# Patient Record
(demographics unavailable — no encounter records)

---

## 2024-10-24 NOTE — RESULTS/DATA
[FreeTextEntry1] : BREAST PATH/RAD REVIEW [facility] Radiology:  7/19/19-7/20/20 BL SC MG/US: BIRADs 2, Scattered Fibroglandular density, 7/28/21 BL SC US: BIRADS 2               BL SC MG: BIRADS 0, New L posterior medial 5 mm nodular asymmetry  8/02/21 L Dx MG/US: BIRADs 2, Stable L 8-9N1-2 4 mm oval circumscribed nodule, L 9:00- periareolar 4 mm cyst w/ debris corresp to stable MG finding 10/03/22 BL SC MG/US: BIRADS 2, Heterogeneously dense  3/14/23 L Dx MG/US: BIRADs 2, Scattered areas of fibroglandular density, L 12N3 no mammogram or sonographic findings at (Palp) area of concern, scattered benign calcs.  [facility]:  3/28/23 Breast MRI: BIRADs 4A - Few scattered sub-cm cysts Bilaterally, No suspicious enhancement in the Right breast  - L UIQ w/ no suspicious enhancement underlying marker denoting palp areas of concern - 7 mm linear NME in the slightly medial L RA location w/ associated prox mild duct dilatation (Rec L Dx US to eval for sonographic correlate then USGBx- if no correlate attempt MRGBx   4/24/23 L Dx US: BIRADs 1: No sonographic correlate to 7 mm linear NME in L RA breast - (Rec attempt MR GBx)   5/3/23 Left [RA] MR Guided core bx: Intraductal papilloma. Benign and Concordant. Stoplight Clip. (Rec Surgical c/s)   10/4/23 B/L SC MG: BIRADs 0. Scattered areas of fibroglandular density (SFGD). Indeterminate right posterior sub-cm nodular asymmetry (Rec R Dx MG/US)  10/12/23 R Dx MG/US: BIRADs 3. SFGD.  R upper posterior nodular asymmetry persists but not apparent on lateral or repeat MLO views and may represent overlapping glandular tissue (Rec 6-month f/u R Dx MG), R 11N5 0.3 cm cyst.    10/13/23 Breast MRI: BIRADs 3.  - L inner RA bx clip at bx-proven IDP with no significant associated enhancement - Stable R LIQ 7 mm linear NME favoring benign etiology, Stable anterior R LOQ 5 mm dominant foci of enhancement, Stable L dominant foci of enhancement in L LOQ [5 mm] and L UOQ [5 mm] since 3/28/23 favoring a benign etiology (Rec 6-month repeat MRI at time of R Dx MG)  - No new enhancement in bilateral breasts, No LAD   5/8/24 R Dx MG: BIRADs 2. Almost entirely fatty. Resolution of previously noted asymmetry.  5/10/24 Breast MRI: BIRADs 3. Stable R LIQ 7 mm linear NME since 3/28/23. Stable R anterior LOQ 5 mm dominant focus of enhancement. L inner RA susceptibility artifact from bx clip w/o significant residual enhancement.  (Rec 6-month f/u MRI to ascertain 2-yr stability)  10/7/24 BL SC MG (TC 5.5%): birads 2. SFGD. Bx marker in L anterior breast.

## 2024-10-24 NOTE — RESULTS/DATA
[FreeTextEntry1] : BREAST PATH/RAD REVIEW [facility] Radiology:  7/19/19-7/20/20 BL SC MG/US: BIRADs 2, Scattered Fibroglandular density, 7/28/21 BL SC US: BIRADS 2               BL SC MG: BIRADS 0, New L posterior medial 5 mm nodular asymmetry  8/02/21 L Dx MG/US: BIRADs 2, Stable L 8-9N1-2 4 mm oval circumscribed nodule, L 9:00- periareolar 4 mm cyst w/ debris corresp to stable MG finding 10/03/22 BL SC MG/US: BIRADS 2, Heterogeneously dense  3/14/23 L Dx MG/US: BIRADs 2, Scattered areas of fibroglandular density, L 12N3 no mammogram or sonographic findings at (Palp) area of concern, scattered benign calcs.  [facility]:  3/28/23 Breast MRI: BIRADs 4A - Few scattered sub-cm cysts Bilaterally, No suspicious enhancement in the Right breast  - L UIQ w/ no suspicious enhancement underlying marker denoting palp areas of concern - 7 mm linear NME in the slightly medial L RA location w/ associated prox mild duct dilatation (Rec L Dx US to eval for sonographic correlate then USGBx- if no correlate attempt MRGBx   4/24/23 L Dx US: BIRADs 1: No sonographic correlate to 7 mm linear NME in L RA breast - (Rec attempt MR GBx)   5/3/23 Left [RA] MR Guided core bx: Intraductal papilloma. Benign and Concordant. Stoplight Clip. (Rec Surgical c/s)   10/4/23 B/L SC MG: BIRADs 0. Scattered areas of fibroglandular density (SFGD). Indeterminate right posterior sub-cm nodular asymmetry (Rec R Dx MG/US)  10/12/23 R Dx MG/US: BIRADs 3. SFGD.  R upper posterior nodular asymmetry persists but not apparent on lateral or repeat MLO views and may represent overlapping glandular tissue (Rec 6-month f/u R Dx MG), R 11N5 0.3 cm cyst.    10/13/23 Breast MRI: BIRADs 3.  - L inner RA bx clip at bx-proven IDP with no significant associated enhancement - Stable R LIQ 7 mm linear NME favoring benign etiology, Stable anterior R LOQ 5 mm dominant foci of enhancement, Stable L dominant foci of enhancement in L LOQ [5 mm] and L UOQ [5 mm] since 3/28/23 favoring a benign etiology (Rec 6-month repeat MRI at time of R Dx MG)  - No new enhancement in bilateral breasts, No LAD   5/8/24 R Dx MG: BIRADs 2. Almost entirely fatty. Resolution of previously noted asymmetry.  5/10/24 Breast MRI: BIRADs 3. Stable R LIQ 7 mm linear NME since 3/28/23. Stable R anterior LOQ 5 mm dominant focus of enhancement. L inner RA susceptibility artifact from bx clip w/o significant residual enhancement.  (Rec 6-month f/u MRI to ascertain 2-yr stability)  10/7/24 BL SC MG (TC 5.5%): birads 2. SFGD. Bx marker in L anterior breast.

## 2024-10-24 NOTE — PHYSICAL EXAM
[Normal] : supple, no neck mass and thyroid not enlarged [Normal Neck Lymph Nodes] : normal neck lymph nodes  [Normal Supraclavicular Lymph Nodes] : normal supraclavicular lymph nodes [Normal Axillary Lymph Nodes] : normal axillary lymph nodes [Normal] : oriented to person, place and time, with appropriate affect [de-identified] : diffuse fibrocystic breast tissue with increased island of palpable dense breast tissue in the left upper inner breast.   No discrete palpable masses in either breast.  No clinical lymphadenopathy.  [de-identified] : Normal respiratory effort

## 2024-10-24 NOTE — ASSESSMENT
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 Here for follow up.   Clinical breast exam today is unremarkable.  I reviewed her recent mammogram rated birads 2.  continue with 2 year MRI monitoring of the IDP.   Next Imaging:  MRI due 5/2025. BL SC MG due October 2025.  She will return to see me in 6 months. She knows to come sooner if any breast issues/concerns arise.

## 2024-10-24 NOTE — HISTORY OF PRESENT ILLNESS
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 Here for follow up.   3/28/23 Initially presented for palpable Left UIQ mass, non-discrete.   5/11/23 Since Last visit has undergone breast MRI which incidentally found a 7mm NME in the left retro areolar.    Left breast U/S on 4/24/2023 showed no U/S correlate and an MRI bx was done on 5/3 which showed IDP, no atypia.   10/17/23 Since last visit patient has undergone annual MG rated BR 0 for indeterminate right posterior upper nodular asymmetry that persisted on targeted MG rated BR 3 and may represent overlapping glandular tissue. Breast MRI rated BR 3 for stable left inner RA bx-proven IDP and bilateral stable likely benign findings. No new breast complaints.  5/16/24  No new breast complaints.  denies breast pain or lumps.   10/24/24 Since last visit has undergone a BL SC MG rated birads 2. No new breast complaints.

## 2024-10-24 NOTE — PHYSICAL EXAM
[Normal] : supple, no neck mass and thyroid not enlarged [Normal Neck Lymph Nodes] : normal neck lymph nodes  [Normal Supraclavicular Lymph Nodes] : normal supraclavicular lymph nodes [Normal Axillary Lymph Nodes] : normal axillary lymph nodes [Normal] : oriented to person, place and time, with appropriate affect [de-identified] : diffuse fibrocystic breast tissue with increased island of palpable dense breast tissue in the left upper inner breast.   No discrete palpable masses in either breast.  No clinical lymphadenopathy.  [de-identified] : Normal respiratory effort

## 2024-10-24 NOTE — REASON FOR VISIT
[facility] [Follow-Up Visit] : a follow-up visit for [FreeTextEntry2] : IDP without atypia - surveillance

## 2024-10-24 NOTE — HISTORY OF PRESENT ILLNESS
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 Here for follow up.   3/28/23 Initially presented for palpable Left UIQ mass, non-discrete.   5/11/23 Since Last visit has undergone breast MRI which incidentally found a 7mm NME in the left retro areolar.    Left breast U/S on 4/24/2023 showed no U/S correlate and an MRI bx was done on 5/3 which showed IDP, no atypia.   10/17/23 Since last visit patient has undergone annual MG rated BR 0 for indeterminate right posterior upper nodular asymmetry that persisted on targeted MG rated BR 3 and may represent overlapping glandular tissue. Breast MRI rated BR 3 for stable left inner RA bx-proven IDP and bilateral stable likely benign findings. No new breast complaints.  5/16/24  No new breast complaints.  denies breast pain or lumps.   10/24/24 Since last visit has undergone a BL SC MG rated birads 2. No new breast complaints.

## 2025-05-27 NOTE — HISTORY OF PRESENT ILLNESS
[de-identified] : EDWARD GALAN is a 69-year F undergoing surveillance for L RA IDP (w/o atypia) with newly diagnosed Left UIQ IDC.   3/28/23 Initially presented for palpable Left UIQ mass, non-discrete.   23 Since Last visit has undergone breast MRI which incidentally found a 7mm NME in the left retro areolar.    Left breast U/S on 2023 showed no U/S correlate and an MRI bx was done on 5/3 which showed IDP, no atypia.   10/17/23 Since last visit patient has undergone annual MG rated BR 0 for indeterminate right posterior upper nodular asymmetry that persisted on targeted MG rated BR 3 and may represent overlapping glandular tissue. Breast MRI rated BR 3 for stable left inner RA bx-proven IDP and bilateral stable likely benign findings. No new breast complaints.  24 No new breast complaints.  denies breast pain or lumps.   10/24/24 Since last visit has undergone a BL SC MG rated birads 2. No new breast complaints.  25 Here for follow up. Here with . Brusing after Left core biopsy.  Pending MOHs surgery that is scheduled for nose SCC.  no breast complaints.    PMHx: HTN, HLD. Fatty liver, arthritis.  Squamous cell carcinoma of nose  PSHx: Denies  Meds: rosuvastatin 10 mg, Losartan 100 mg. ASA 81 mg.  Allergies: PCN- Rash  Family Hx: Gastric cancer (Mother)  GYN: , menarche age 12, Menopause at age 50. Age at first pregnancy 29.  N.  h/o of OCP use for 7 years.  Bra size: Unknown.  Occupation: Retired Teacher.  Social: Smoking:  Denies      ETOH: Socially

## 2025-05-27 NOTE — PHYSICAL EXAM
[Normocephalic] : normocephalic [Atraumatic] : atraumatic [Supple] : supple [No Supraclavicular Adenopathy] : no supraclavicular adenopathy [No Cervical Adenopathy] : no cervical adenopathy [Examined in the supine and seated position] : examined in the supine and seated position [No Axillary Lymphadenopathy] : no left axillary lymphadenopathy [Full ROM] : full range of motion [No Rashes] : no rashes [No Ulceration] : no ulceration [de-identified] :  Normal respiratory effort [de-identified] : Left UIQ post-biopsy bruising now in the yellowing stage of healing.  No palpable masses in either breast. no clinical lymphadenopathy

## 2025-05-27 NOTE — RESULTS/DATA
[FreeTextEntry1] : BREAST PATH/RAD REVIEW [facility] Radiology:  7/19/19-7/20/20 BL SC MG/US: BIRADs 2, Scattered Fibroglandular density, 7/28/21 BL SC US: BIRADS 2               BL SC MG: BIRADS 0, New L posterior medial 5 mm nodular asymmetry  8/02/21 L Dx MG/US: BIRADs 2, Stable L 8-9N1-2 4 mm oval circumscribed nodule, L 9:00- periareolar 4 mm cyst w/ debris corresp to stable MG finding 10/03/22 BL SC MG/US: BIRADS 2, Heterogeneously dense  3/14/23 L Dx MG/US: BIRADs 2, Scattered areas of fibroglandular density, L 12N3 no mammogram or sonographic findings at (Palp) area of concern, scattered benign calcs.  [facility]:  3/28/23 Breast MRI: BIRADs 4A - Few scattered sub-cm cysts Bilaterally, No suspicious enhancement in the Right breast  - L UIQ w/ no suspicious enhancement underlying marker denoting palp areas of concern - 7 mm linear NME in the slightly medial L RA location w/ associated prox mild duct dilatation (Rec L Dx US to eval for sonographic correlate then USGBx- if no correlate attempt MRGBx   4/24/23 L Dx US: BIRADs 1: No sonographic correlate to 7 mm linear NME in L RA breast - (Rec attempt MR GBx)   5/3/23 Left [RA] MR Guided core bx: Intraductal papilloma. Benign and Concordant. Stoplight Clip. (Rec Surgical c/s)   10/4/23 B/L SC MG: BIRADs 0. Scattered areas of fibroglandular density (SFGD). Indeterminate right posterior sub-cm nodular asymmetry (Rec R Dx MG/US)  10/12/23 R Dx MG/US: BIRADs 3. SFGD.  R upper posterior nodular asymmetry persists but not apparent on lateral or repeat MLO views and may represent overlapping glandular tissue (Rec 6-month f/u R Dx MG), R 11N5 0.3 cm cyst.    10/13/23 Breast MRI: BIRADs 3.  - L inner RA bx clip at bx-proven IDP with no significant associated enhancement - Stable R LIQ 7 mm linear NME favoring benign etiology, Stable anterior R LOQ 5 mm dominant foci of enhancement, Stable L dominant foci of enhancement in L LOQ [5 mm] and L UOQ [5 mm] since 3/28/23 favoring a benign etiology (Rec 6-month repeat MRI at time of R Dx MG)  - No new enhancement in bilateral breasts, No LAD   5/8/24 R Dx MG: BIRADs 2. Almost entirely fatty. Resolution of previously noted asymmetry.  5/10/24 Breast MRI: BIRADs 3. Stable R LIQ 7 mm linear NME since 3/28/23. Stable R anterior LOQ 5 mm dominant focus of enhancement. L inner RA susceptibility artifact from bx clip w/o significant residual enhancement.  (Rec 6-month f/u MRI to ascertain 2-yr stability)   10/7/24 BL SC MG (TC 5.5%): birads 2. SFGD. Bx marker in L anterior breast.   5/12/25 Breast MRI: birads 4A.  - L upper slightly inner clumped NME (Rec MR guided bx). L slightly inner RA artifact at site of bx-proven IDP.  - R breast w/o suspicious enhancement.  - B/L areas of enhancement demonstrating 2-year stability corresponding w/ benign etiology.  - No axillary or IM LAD, bilaterally.   5/15/25 Left breast [upper slightly inner] MR guided bx: Invasive mammary carcinoma w/ lobular growth pattern (G2), ADH. [hourglass clip]. Concordant. (Rec surg onc). Pending receptors.  Addendum: Confirmed ductal differentiation in the tumor.

## 2025-05-27 NOTE — HISTORY OF PRESENT ILLNESS
[de-identified] : EDWARD GALAN is a 69-year F undergoing surveillance for L RA IDP (w/o atypia) with newly diagnosed Left UIQ IDC.   3/28/23 Initially presented for palpable Left UIQ mass, non-discrete.   23 Since Last visit has undergone breast MRI which incidentally found a 7mm NME in the left retro areolar.    Left breast U/S on 2023 showed no U/S correlate and an MRI bx was done on 5/3 which showed IDP, no atypia.   10/17/23 Since last visit patient has undergone annual MG rated BR 0 for indeterminate right posterior upper nodular asymmetry that persisted on targeted MG rated BR 3 and may represent overlapping glandular tissue. Breast MRI rated BR 3 for stable left inner RA bx-proven IDP and bilateral stable likely benign findings. No new breast complaints.  24 No new breast complaints.  denies breast pain or lumps.   10/24/24 Since last visit has undergone a BL SC MG rated birads 2. No new breast complaints.  25 Here for follow up. Here with . Brusing after Left core biopsy.  Pending MOHs surgery that is scheduled for nose SCC.  no breast complaints.    PMHx: HTN, HLD. Fatty liver, arthritis.  Squamous cell carcinoma of nose  PSHx: Denies  Meds: rosuvastatin 10 mg, Losartan 100 mg. ASA 81 mg.  Allergies: PCN- Rash  Family Hx: Gastric cancer (Mother)  GYN: , menarche age 12, Menopause at age 50. Age at first pregnancy 29.  N.  h/o of OCP use for 7 years.  Bra size: Unknown.  Occupation: Retired Teacher.  Social: Smoking:  Denies      ETOH: Socially

## 2025-05-27 NOTE — HISTORY OF PRESENT ILLNESS
[de-identified] : EDWARD GALAN is a 69-year F undergoing surveillance for L RA IDP (w/o atypia) with newly diagnosed Left UIQ IDC.   3/28/23 Initially presented for palpable Left UIQ mass, non-discrete.   23 Since Last visit has undergone breast MRI which incidentally found a 7mm NME in the left retro areolar.    Left breast U/S on 2023 showed no U/S correlate and an MRI bx was done on 5/3 which showed IDP, no atypia.   10/17/23 Since last visit patient has undergone annual MG rated BR 0 for indeterminate right posterior upper nodular asymmetry that persisted on targeted MG rated BR 3 and may represent overlapping glandular tissue. Breast MRI rated BR 3 for stable left inner RA bx-proven IDP and bilateral stable likely benign findings. No new breast complaints.  24 No new breast complaints.  denies breast pain or lumps.   10/24/24 Since last visit has undergone a BL SC MG rated birads 2. No new breast complaints.  25 Here for follow up. Here with . Brusing after Left core biopsy.  Pending MOHs surgery that is scheduled for nose SCC.  no breast complaints.    PMHx: HTN, HLD. Fatty liver, arthritis.  Squamous cell carcinoma of nose  PSHx: Denies  Meds: rosuvastatin 10 mg, Losartan 100 mg. ASA 81 mg.  Allergies: PCN- Rash  Family Hx: Gastric cancer (Mother)  GYN: , menarche age 12, Menopause at age 50. Age at first pregnancy 29.  N.  h/o of OCP use for 7 years.  Bra size: Unknown.  Occupation: Retired Teacher.  Social: Smoking:  Denies      ETOH: Socially

## 2025-05-27 NOTE — ASSESSMENT
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    We had a lengthy discussion regarding the natural history of breast cancer, her pathology results, her breast imaging, staging, surgical options and adjuvant treatments. Please see scanned progress noted for personalized cancer care plan.   Discussed surgical options of lumpectomy and mastectomy. Patient desires breast conservation.  Will plan to omit a sentinel lymph node biopsy as she meets criteria for the sound trial given her age, small size of invasive carcinoma and no lymph node involvement on breast imaging.  Will plan for a 2-site scot  localization to include known Left UIQ IDC and Left RA IDP.   Surgical plan: Left breast [2site]  scot localized lumpectomy, possible tissue transfer Surgical date:  Pending OR availability.   - f/u receptors (pending) - Genetics drawn in office today.  - Will connect with nurse navigation program for additional support and assistance with scheduling. - RTO in 1 week.

## 2025-05-27 NOTE — RESULTS/DATA
[FreeTextEntry1] : BREAST PATH/RAD REVIEW [facility] Radiology:  7/19/19-7/20/20 BL SC MG/US: BIRADs 2, Scattered Fibroglandular density, 7/28/21 BL SC US: BIRADS 2               BL SC MG: BIRADS 0, New L posterior medial 5 mm nodular asymmetry  8/02/21 L Dx MG/US: BIRADs 2, Stable L 8-9N1-2 4 mm oval circumscribed nodule, L 9:00- periareolar 4 mm cyst w/ debris corresp to stable MG finding 10/03/22 BL SC MG/US: BIRADS 2, Heterogeneously dense  3/14/23 L Dx MG/US: BIRADs 2, Scattered areas of fibroglandular density, L 12N3 no mammogram or sonographic findings at (Palp) area of concern, scattered benign calcs.  [facility]:  3/28/23 Breast MRI: BIRADs 4A - Few scattered sub-cm cysts Bilaterally, No suspicious enhancement in the Right breast  - L UIQ w/ no suspicious enhancement underlying marker denoting palp areas of concern - 7 mm linear NME in the slightly medial L RA location w/ associated prox mild duct dilatation (Rec L Dx US to eval for sonographic correlate then USGBx- if no correlate attempt MRGBx   4/24/23 L Dx US: BIRADs 1: No sonographic correlate to 7 mm linear NME in L RA breast - (Rec attempt MR GBx)   5/3/23 Left [RA] MR Guided core bx: Intraductal papilloma. Benign and Concordant. Stoplight Clip. (Rec Surgical c/s)   10/4/23 B/L SC MG: BIRADs 0. Scattered areas of fibroglandular density (SFGD). Indeterminate right posterior sub-cm nodular asymmetry (Rec R Dx MG/US)  10/12/23 R Dx MG/US: BIRADs 3. SFGD.  R upper posterior nodular asymmetry persists but not apparent on lateral or repeat MLO views and may represent overlapping glandular tissue (Rec 6-month f/u R Dx MG), R 11N5 0.3 cm cyst.    10/13/23 Breast MRI: BIRADs 3.  - L inner RA bx clip at bx-proven IDP with no significant associated enhancement - Stable R LIQ 7 mm linear NME favoring benign etiology, Stable anterior R LOQ 5 mm dominant foci of enhancement, Stable L dominant foci of enhancement in L LOQ [5 mm] and L UOQ [5 mm] since 3/28/23 favoring a benign etiology (Rec 6-month repeat MRI at time of R Dx MG)  - No new enhancement in bilateral breasts, No LAD   5/8/24 R Dx MG: BIRADs 2. Almost entirely fatty. Resolution of previously noted asymmetry.  5/10/24 Breast MRI: BIRADs 3. Stable R LIQ 7 mm linear NME since 3/28/23. Stable R anterior LOQ 5 mm dominant focus of enhancement. L inner RA susceptibility artifact from bx clip w/o significant residual enhancement.  (Rec 6-month f/u MRI to ascertain 2-yr stability)   10/7/24 BL SC MG (TC 5.5%): birads 2. SFGD. Bx marker in L anterior breast.   5/12/25 Breast MRI: birads 4A.  - L upper slightly inner clumped NME (Rec MR guided bx). L slightly inner RA artifact at site of bx-proven IDP.  - R breast w/o suspicious enhancement.  - B/L areas of enhancement demonstrating 2-year stability corresponding w/ benign etiology.  - No axillary or IM LAD, bilaterally.   5/15/25 Left breast [upper slightly inner] MR guided bx: Invasive mammary carcinoma w/ lobular growth pattern (G2), ADH. [hourglass clip]. Concordant. (Rec surg onc). Pending receptors.  Addendum: Confirmed ductal differentiation in the tumor.

## 2025-05-27 NOTE — PHYSICAL EXAM
[Normocephalic] : normocephalic [Atraumatic] : atraumatic [Supple] : supple [No Supraclavicular Adenopathy] : no supraclavicular adenopathy [No Cervical Adenopathy] : no cervical adenopathy [Examined in the supine and seated position] : examined in the supine and seated position [No Axillary Lymphadenopathy] : no left axillary lymphadenopathy [Full ROM] : full range of motion [No Rashes] : no rashes [No Ulceration] : no ulceration [de-identified] :  Normal respiratory effort [de-identified] : Left UIQ post-biopsy bruising now in the yellowing stage of healing.  No palpable masses in either breast. no clinical lymphadenopathy

## 2025-05-27 NOTE — PHYSICAL EXAM
[Normocephalic] : normocephalic [Atraumatic] : atraumatic [Supple] : supple [No Supraclavicular Adenopathy] : no supraclavicular adenopathy [No Cervical Adenopathy] : no cervical adenopathy [Examined in the supine and seated position] : examined in the supine and seated position [No Axillary Lymphadenopathy] : no left axillary lymphadenopathy [Full ROM] : full range of motion [No Rashes] : no rashes [No Ulceration] : no ulceration [de-identified] :  Normal respiratory effort [de-identified] : Left UIQ post-biopsy bruising now in the yellowing stage of healing.  No palpable masses in either breast. no clinical lymphadenopathy

## 2025-06-03 NOTE — HISTORY OF PRESENT ILLNESS
[de-identified] : 69-year F w/ Left UIQ IDC with lobular growth pattern ER+, CT+, HER2- and L RA IDP. Here for follow up.   3/28/23 Initially presented for palpable Left UIQ mass, non-discrete.   23 Since Last visit has undergone breast MRI which incidentally found a 7mm NME in the left retro areolar.    Left breast U/S on 2023 showed no U/S correlate and an MRI bx was done on 5/3 which showed IDP, no atypia.   10/17/23 Since last visit patient has undergone annual MG rated BR 0 for indeterminate right posterior upper nodular asymmetry that persisted on targeted MG rated BR 3 and may represent overlapping glandular tissue. Breast MRI rated BR 3 for stable left inner RA bx-proven IDP and bilateral stable likely benign findings. No new breast complaints.  24 No new breast complaints.  denies breast pain or lumps.   10/24/24 Since last visit has undergone a BL SC MG rated birads 2. No new breast complaints.  25 New Dx of Left IDC++-. Here with . Brusing after Left core biopsy.  Pending MOHs surgery that is scheduled for nose SCC.  no breast complaints.  6/3/25 Here for follow-up. Patient due to undergo Mohs surgery on  at [facility] (Dr. Devries).  Receptors resulted as ++-.     PMHx: HTN, HLD. Fatty liver, arthritis.  Squamous cell carcinoma of nose  PSHx: Denies  Meds: rosuvastatin 10 mg, Losartan 100 mg. ASA 81 mg.  Allergies: PCN- Rash  Family Hx: Gastric cancer (Mother)  GYN: , menarche age 12, Menopause at age 50. Age at first pregnancy 29.  N.  h/o of OCP use for 7 years.  Bra size: Unknown.  Occupation: Retired Teacher.  Social: Smoking:  Denies      ETOH: Socially

## 2025-06-03 NOTE — RESULTS/DATA
[FreeTextEntry1] : BREAST PATH/RAD REVIEW [facility] Radiology:  7/19/19-7/20/20 BL SC MG/US: BIRADs 2, Scattered Fibroglandular density, 7/28/21 BL SC US: BIRADS 2               BL SC MG: BIRADS 0, New L posterior medial 5 mm nodular asymmetry  8/02/21 L Dx MG/US: BIRADs 2, Stable L 8-9N1-2 4 mm oval circumscribed nodule, L 9:00- periareolar 4 mm cyst w/ debris corresp to stable MG finding 10/03/22 BL SC MG/US: BIRADS 2, Heterogeneously dense  3/14/23 L Dx MG/US: BIRADs 2, Scattered areas of fibroglandular density, L 12N3 no mammogram or sonographic findings at (Palp) area of concern, scattered benign calcs.  [facility]:  3/28/23 Breast MRI: BIRADs 4A - Few scattered sub-cm cysts Bilaterally, No suspicious enhancement in the Right breast  - L UIQ w/ no suspicious enhancement underlying marker denoting palp areas of concern - 7 mm linear NME in the slightly medial L RA location w/ associated prox mild duct dilatation (Rec L Dx US to eval for sonographic correlate then USGBx- if no correlate attempt MRGBx   4/24/23 L Dx US: BIRADs 1: No sonographic correlate to 7 mm linear NME in L RA breast - (Rec attempt MR GBx)   5/3/23 Left [RA] MR Guided core bx: Intraductal papilloma. Benign and Concordant. Stoplight Clip. (Rec Surgical c/s)   10/4/23 B/L SC MG: BIRADs 0. Scattered areas of fibroglandular density (SFGD). Indeterminate right posterior sub-cm nodular asymmetry (Rec R Dx MG/US)  10/12/23 R Dx MG/US: BIRADs 3. SFGD.  R upper posterior nodular asymmetry persists but not apparent on lateral or repeat MLO views and may represent overlapping glandular tissue (Rec 6-month f/u R Dx MG), R 11N5 0.3 cm cyst.    10/13/23 Breast MRI: BIRADs 3.  - L inner RA bx clip at bx-proven IDP with no significant associated enhancement - Stable R LIQ 7 mm linear NME favoring benign etiology, Stable anterior R LOQ 5 mm dominant foci of enhancement, Stable L dominant foci of enhancement in L LOQ [5 mm] and L UOQ [5 mm] since 3/28/23 favoring a benign etiology (Rec 6-month repeat MRI at time of R Dx MG)  - No new enhancement in bilateral breasts, No LAD   5/8/24 R Dx MG: BIRADs 2. Almost entirely fatty. Resolution of previously noted asymmetry.  5/10/24 Breast MRI: BIRADs 3. Stable R LIQ 7 mm linear NME since 3/28/23. Stable R anterior LOQ 5 mm dominant focus of enhancement. L inner RA susceptibility artifact from bx clip w/o significant residual enhancement.  (Rec 6-month f/u MRI to ascertain 2-yr stability)   10/7/24 BL SC MG (TC 5.5%): birads 2. SFGD. Bx marker in L anterior breast.   5/12/25 Breast MRI: birads 4A.  - L upper slightly inner clumped NME (Rec MR guided bx). L slightly inner RA artifact at site of bx-proven IDP.  - R breast w/o suspicious enhancement.  - B/L areas of enhancement demonstrating 2-year stability corresponding w/ benign etiology.  - No axillary or IM LAD, bilaterally.   5/15/25 Left breast [upper slightly inner] MR guided bx: Invasive mammary carcinoma w/ lobular growth pattern (G2), ADH. [hourglass clip]. Concordant. (Rec surg onc). ER 80%, CA 90%. HER2 -CISH Negative.  Addendum: Confirmed ductal differentiation in the tumor.

## 2025-06-03 NOTE — PHYSICAL EXAM
[Normocephalic] : normocephalic [Atraumatic] : atraumatic [Supple] : supple [No Supraclavicular Adenopathy] : no supraclavicular adenopathy [No Cervical Adenopathy] : no cervical adenopathy [Examined in the supine and seated position] : examined in the supine and seated position [Symmetrical] : symmetrical [No Axillary Lymphadenopathy] : no left axillary lymphadenopathy [Full ROM] : full range of motion [No Rashes] : no rashes [No Ulceration] : no ulceration [de-identified] : Normal respiratory effort [de-identified] : Left breast with post-biopsy hematoma.   no palpable masses in either breast.  no clinical lymphadenopathy

## 2025-06-03 NOTE — RESULTS/DATA
[FreeTextEntry1] : BREAST PATH/RAD REVIEW [facility] Radiology:  7/19/19-7/20/20 BL SC MG/US: BIRADs 2, Scattered Fibroglandular density, 7/28/21 BL SC US: BIRADS 2               BL SC MG: BIRADS 0, New L posterior medial 5 mm nodular asymmetry  8/02/21 L Dx MG/US: BIRADs 2, Stable L 8-9N1-2 4 mm oval circumscribed nodule, L 9:00- periareolar 4 mm cyst w/ debris corresp to stable MG finding 10/03/22 BL SC MG/US: BIRADS 2, Heterogeneously dense  3/14/23 L Dx MG/US: BIRADs 2, Scattered areas of fibroglandular density, L 12N3 no mammogram or sonographic findings at (Palp) area of concern, scattered benign calcs.  [facility]:  3/28/23 Breast MRI: BIRADs 4A - Few scattered sub-cm cysts Bilaterally, No suspicious enhancement in the Right breast  - L UIQ w/ no suspicious enhancement underlying marker denoting palp areas of concern - 7 mm linear NME in the slightly medial L RA location w/ associated prox mild duct dilatation (Rec L Dx US to eval for sonographic correlate then USGBx- if no correlate attempt MRGBx   4/24/23 L Dx US: BIRADs 1: No sonographic correlate to 7 mm linear NME in L RA breast - (Rec attempt MR GBx)   5/3/23 Left [RA] MR Guided core bx: Intraductal papilloma. Benign and Concordant. Stoplight Clip. (Rec Surgical c/s)   10/4/23 B/L SC MG: BIRADs 0. Scattered areas of fibroglandular density (SFGD). Indeterminate right posterior sub-cm nodular asymmetry (Rec R Dx MG/US)  10/12/23 R Dx MG/US: BIRADs 3. SFGD.  R upper posterior nodular asymmetry persists but not apparent on lateral or repeat MLO views and may represent overlapping glandular tissue (Rec 6-month f/u R Dx MG), R 11N5 0.3 cm cyst.    10/13/23 Breast MRI: BIRADs 3.  - L inner RA bx clip at bx-proven IDP with no significant associated enhancement - Stable R LIQ 7 mm linear NME favoring benign etiology, Stable anterior R LOQ 5 mm dominant foci of enhancement, Stable L dominant foci of enhancement in L LOQ [5 mm] and L UOQ [5 mm] since 3/28/23 favoring a benign etiology (Rec 6-month repeat MRI at time of R Dx MG)  - No new enhancement in bilateral breasts, No LAD   5/8/24 R Dx MG: BIRADs 2. Almost entirely fatty. Resolution of previously noted asymmetry.  5/10/24 Breast MRI: BIRADs 3. Stable R LIQ 7 mm linear NME since 3/28/23. Stable R anterior LOQ 5 mm dominant focus of enhancement. L inner RA susceptibility artifact from bx clip w/o significant residual enhancement.  (Rec 6-month f/u MRI to ascertain 2-yr stability)   10/7/24 BL SC MG (TC 5.5%): birads 2. SFGD. Bx marker in L anterior breast.   5/12/25 Breast MRI: birads 4A.  - L upper slightly inner clumped NME (Rec MR guided bx). L slightly inner RA artifact at site of bx-proven IDP.  - R breast w/o suspicious enhancement.  - B/L areas of enhancement demonstrating 2-year stability corresponding w/ benign etiology.  - No axillary or IM LAD, bilaterally.   5/15/25 Left breast [upper slightly inner] MR guided bx: Invasive mammary carcinoma w/ lobular growth pattern (G2), ADH. [hourglass clip]. Concordant. (Rec surg onc). ER 80%, TN 90%. HER2 -CISH Negative.  Addendum: Confirmed ductal differentiation in the tumor.

## 2025-06-03 NOTE — PHYSICAL EXAM
[Normocephalic] : normocephalic [Atraumatic] : atraumatic [Supple] : supple [No Supraclavicular Adenopathy] : no supraclavicular adenopathy [No Cervical Adenopathy] : no cervical adenopathy [Examined in the supine and seated position] : examined in the supine and seated position [Symmetrical] : symmetrical [No Axillary Lymphadenopathy] : no left axillary lymphadenopathy [Full ROM] : full range of motion [No Rashes] : no rashes [No Ulceration] : no ulceration [de-identified] : Normal respiratory effort [de-identified] : Left breast with post-biopsy hematoma.   no palpable masses in either breast.  no clinical lymphadenopathy

## 2025-06-03 NOTE — HISTORY OF PRESENT ILLNESS
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 Here for follow up.   3/28/23 Initially presented for palpable Left UIQ mass, non-discrete.   23 Since Last visit has undergone breast MRI which incidentally found a 7mm NME in the left retro areolar.    Left breast U/S on 2023 showed no U/S correlate and an MRI bx was done on 5/3 which showed IDP, no atypia.   10/17/23 Since last visit patient has undergone annual MG rated BR 0 for indeterminate right posterior upper nodular asymmetry that persisted on targeted MG rated BR 3 and may represent overlapping glandular tissue. Breast MRI rated BR 3 for stable left inner RA bx-proven IDP and bilateral stable likely benign findings. No new breast complaints.  24 No new breast complaints.  denies breast pain or lumps.   10/24/24 Since last visit has undergone a BL SC MG rated birads 2. No new breast complaints.  25 New Dx of Left IDC++-. Here with . Brusing after Left core biopsy.  Pending MOHs surgery that is scheduled for nose SCC.  no breast complaints.  6/3/25 Here for follow-up. Patient due to undergo Mohs surgery on  at [facility] (Dr. Devries).  Receptors resulted as ++-.     PMHx: HTN, HLD. Fatty liver, arthritis.  Squamous cell carcinoma of nose  PSHx: Denies  Meds: rosuvastatin 10 mg, Losartan 100 mg. ASA 81 mg.  Allergies: PCN- Rash  Family Hx: Gastric cancer (Mother)  GYN: , menarche age 12, Menopause at age 50. Age at first pregnancy 29.  N.  h/o of OCP use for 7 years.  Bra size: Unknown.  Occupation: Retired Teacher.  Social: Smoking:  Denies      ETOH: Socially

## 2025-06-03 NOTE — ASSESSMENT
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 Here for follow up.    We reviewed her receptor histology and reviewed the adjuvant treatment plan.   Please see scanned progress note for personalized cancer care plan.   Will plan for a 2-site scot  localization to include known Left UIQ IDC and Left RA IDP.   Surgical plan: Left breast [2site] scot localized lumpectomy, possible tissue transfer Surgical date:  6/11/2025 [facility].   - Genetics (pending) - Connected with the nurse navigation program. Navigator is Ashish.  - RTO for post-op check.

## 2025-06-26 NOTE — PHYSICAL EXAM
[Normocephalic] : normocephalic [Atraumatic] : atraumatic [No Axillary Lymphadenopathy] : no left axillary lymphadenopathy [Full ROM] : full range of motion [No Rashes] : no rashes [No Ulceration] : no ulceration [de-identified] : Normal respiratory effort  [de-identified] : Left breast surgical incision is c/d/i, well-approximated, mild-mod ecchymosis, no evidence of infection, no parenchymal defect.

## 2025-06-26 NOTE — ASSESSMENT
[FreeTextEntry1] : 69-year F w/ Left UIQ IDC +/+/-, L RA IDP s/p 2-site loc lumpectomy on 6/11/25. Here for post-op visit.    We discussed her surgical pathology from the left upper lumpectomy site confirming - 2.2 cm left upper IDC (G2) w/ lobular growth pattern. Negative margins for invasive carcinoma. Low to intermediate grade DCIS that is focally <1 mm from the posterior margin. Additional classic LCIS, calcs in DCIS, LCIS and benign epithelium.  Left retro-areolar lumpectomy site reveled- classic LCIS.  - Referred to medical oncology and radiation oncology for adjuvant treatment recommendations.   oncotype dx will be sent.   - Next imaging: BL Dx MG due 10/2025, Breast MRI due 5/2026.  - [employer]: Negative, VUS (BRIP1).  - Connected with the nurse navigation program. Navigator is Ashish.  - RTO for 3-month follow up.

## 2025-06-26 NOTE — RESULTS/DATA
[FreeTextEntry1] : BREAST PATH/RAD REVIEW [facility] Radiology:  7/19/19-7/20/20 BL SC MG/US: BIRADs 2, Scattered Fibroglandular density, 7/28/21 BL SC US: BIRADS 2               BL SC MG: BIRADS 0, New L posterior medial 5 mm nodular asymmetry  8/02/21 L Dx MG/US: BIRADs 2, Stable L 8-9N1-2 4 mm oval circumscribed nodule, L 9:00- periareolar 4 mm cyst w/ debris corresp to stable MG finding 10/03/22 BL SC MG/US: BIRADS 2, Heterogeneously dense  3/14/23 L Dx MG/US: BIRADs 2, Scattered areas of fibroglandular density, L 12N3 no mammogram or sonographic findings at (Palp) area of concern, scattered benign calcs.  [facility]:  3/28/23 Breast MRI: BIRADs 4A - Few scattered sub-cm cysts Bilaterally, No suspicious enhancement in the Right breast  - L UIQ w/ no suspicious enhancement underlying marker denoting palp areas of concern - 7 mm linear NME in the slightly medial L RA location w/ associated prox mild duct dilatation (Rec L Dx US to eval for sonographic correlate then USGBx- if no correlate attempt MRGBx   4/24/23 L Dx US: BIRADs 1: No sonographic correlate to 7 mm linear NME in L RA breast - (Rec attempt MR GBx)   5/3/23 Left [RA] MR Guided core bx: Intraductal papilloma. Benign and Concordant. Stoplight Clip. (Rec Surgical c/s)   10/4/23 B/L SC MG: BIRADs 0. Scattered areas of fibroglandular density (SFGD). Indeterminate right posterior sub-cm nodular asymmetry (Rec R Dx MG/US)  10/12/23 R Dx MG/US: BIRADs 3. SFGD.  R upper posterior nodular asymmetry persists but not apparent on lateral or repeat MLO views and may represent overlapping glandular tissue (Rec 6-month f/u R Dx MG), R 11N5 0.3 cm cyst.    10/13/23 Breast MRI: BIRADs 3.  - L inner RA bx clip at bx-proven IDP with no significant associated enhancement - Stable R LIQ 7 mm linear NME favoring benign etiology, Stable anterior R LOQ 5 mm dominant foci of enhancement, Stable L dominant foci of enhancement in L LOQ [5 mm] and L UOQ [5 mm] since 3/28/23 favoring a benign etiology (Rec 6-month repeat MRI at time of R Dx MG)  - No new enhancement in bilateral breasts, No LAD   5/8/24 R Dx MG: BIRADs 2. Almost entirely fatty. Resolution of previously noted asymmetry.  5/10/24 Breast MRI: BIRADs 3. Stable R LIQ 7 mm linear NME since 3/28/23. Stable R anterior LOQ 5 mm dominant focus of enhancement. L inner RA susceptibility artifact from bx clip w/o significant residual enhancement.  (Rec 6-month f/u MRI to ascertain 2-yr stability)   10/7/24 BL SC MG (TC 5.5%): birads 2. SFGD. Bx marker in L anterior breast.   5/12/25 Breast MRI: birads 4A.  - L upper slightly inner clumped NME (Rec MR guided bx). L slightly inner RA artifact at site of bx-proven IDP.  - R breast w/o suspicious enhancement.  - B/L areas of enhancement demonstrating 2-year stability corresponding w/ benign etiology.  - No axillary or IM LAD, bilaterally.   5/15/25 Left breast [upper slightly inner] MR guided bx: Invasive mammary carcinoma w/ lobular growth pattern (G2), ADH. [hourglass clip]. Concordant. (Rec surg onc). ER 80%, [place] 90%. HER2 -CISH Negative.  Addendum: Confirmed ductal differentiation in the tumor.   6/11/25 Left breast scot loc lumpectomy x2. pT2 Nx (Left)  1. Left upper lumpectomy: IDC (G2) w/ lobular growth pattern to be 22 mm. LVP not seen, negative margins for invasive carcinoma. Low to intermediate grade DCIS that is focally <1 mm from the posterior margin. Additional findings: Classic LCIS, FCC, Bx site changes, calcs in DCIS, LCIS and benign epithelium.  2. Left upper medial margin: Benign, bx site changes.  3. Left upper superior margin: Benign, bx site changes 4. Left [RA] lumpectomy: Classic LCIS. FCC, Bx site changes.

## 2025-06-26 NOTE — HISTORY OF PRESENT ILLNESS
[FreeTextEntry1] : 69-year F w/ Left UIQ IDC +/+/-, L RA IDP s/p 2-site loc lumpectomy on 25. Here for post-op visit.   3/28/23 Initially presented for palpable Left UIQ mass, non-discrete.   23 Since Last visit has undergone breast MRI which incidentally found a 7mm NME in the left retro areolar.    Left breast U/S on 2023 showed no U/S correlate and an MRI bx was done on 5/3 which showed IDP, no atypia.   10/17/23 Since last visit patient has undergone annual MG rated BR 0 for indeterminate right posterior upper nodular asymmetry that persisted on targeted MG rated BR 3 and may represent overlapping glandular tissue. Breast MRI rated BR 3 for stable left inner RA bx-proven IDP and bilateral stable likely benign findings. No new breast complaints.  24 No new breast complaints.  denies breast pain or lumps.   10/24/24 Since last visit has undergone a BL SC MG rated birads 2. No new breast complaints.  25 New Dx of Left IDC++-. Here with . Brusing after Left core biopsy.  Pending MOHs surgery that is scheduled for nose SCC.  no breast complaints.  6/3/25 Here for follow-up. Patient due to undergo Mohs surgery on  at [facility] (Dr. Devries).  Receptors resulted as ++-.   25 SURGERY: Left breast scot loc lumpectomy x2. L [upper] IDC (G2) 2.2 cm. Negative margins for invasive. Associate DCIS (G1-2) focally <1 mm from the posterior margin. Additional classic LCIS. L [RA] classic LCIS 25 Here for post-op check. (POD#15)  pain well controlled.  no complaints.    had Moh's surgery on nose.    PMHx: HTN, HLD. Fatty liver, arthritis.  Squamous cell carcinoma of nose  PSHx: Denies  Meds: rosuvastatin 10 mg, Losartan 100 mg. ASA 81 mg.  Allergies: PCN- Rash  Family Hx: Gastric cancer (Mother)  GYN: , menarche age 12, Menopause at age 50. Age at first pregnancy 29.  N.  h/o of OCP use for 7 years.  Bra size: Unknown.  Occupation: Retired Teacher.  Social: Smoking:  Denies      ETOH: Socially

## 2025-07-07 NOTE — RESULTS/DATA
[FreeTextEntry1] : BREAST PATH/RAD REVIEW [facility] Radiology:  7/19/19-7/20/20 BL SC MG/US: BIRADs 2, Scattered Fibroglandular density, 7/28/21 BL SC US: BIRADS 2               BL SC MG: BIRADS 0, New L posterior medial 5 mm nodular asymmetry  8/02/21 L Dx MG/US: BIRADs 2, Stable L 8-9N1-2 4 mm oval circumscribed nodule, L 9:00- periareolar 4 mm cyst w/ debris corresp to stable MG finding 10/03/22 BL SC MG/US: BIRADS 2, Heterogeneously dense  3/14/23 L Dx MG/US: BIRADs 2, Scattered areas of fibroglandular density, L 12N3 no mammogram or sonographic findings at (Palp) area of concern, scattered benign calcs.  [facility]:  3/28/23 Breast MRI: BIRADs 4A - Few scattered sub-cm cysts Bilaterally, No suspicious enhancement in the Right breast  - L UIQ w/ no suspicious enhancement underlying marker denoting palp areas of concern - 7 mm linear NME in the slightly medial L RA location w/ associated prox mild duct dilatation (Rec L Dx US to eval for sonographic correlate then USGBx- if no correlate attempt MRGBx   4/24/23 L Dx US: BIRADs 1: No sonographic correlate to 7 mm linear NME in L RA breast - (Rec attempt MR GBx)   5/3/23 Left [RA] MR Guided core bx: Intraductal papilloma. Benign and Concordant. Stoplight Clip. (Rec Surgical c/s)   10/4/23 B/L SC MG: BIRADs 0. Scattered areas of fibroglandular density (SFGD). Indeterminate right posterior sub-cm nodular asymmetry (Rec R Dx MG/US)  10/12/23 R Dx MG/US: BIRADs 3. SFGD.  R upper posterior nodular asymmetry persists but not apparent on lateral or repeat MLO views and may represent overlapping glandular tissue (Rec 6-month f/u R Dx MG), R 11N5 0.3 cm cyst.    10/13/23 Breast MRI: BIRADs 3.  - L inner RA bx clip at bx-proven IDP with no significant associated enhancement - Stable R LIQ 7 mm linear NME favoring benign etiology, Stable anterior R LOQ 5 mm dominant foci of enhancement, Stable L dominant foci of enhancement in L LOQ [5 mm] and L UOQ [5 mm] since 3/28/23 favoring a benign etiology (Rec 6-month repeat MRI at time of R Dx MG)  - No new enhancement in bilateral breasts, No LAD   5/8/24 R Dx MG: BIRADs 2. Almost entirely fatty. Resolution of previously noted asymmetry.  5/10/24 Breast MRI: BIRADs 3. Stable R LIQ 7 mm linear NME since 3/28/23. Stable R anterior LOQ 5 mm dominant focus of enhancement. L inner RA susceptibility artifact from bx clip w/o significant residual enhancement.  (Rec 6-month f/u MRI to ascertain 2-yr stability)   10/7/24 BL SC MG (TC 5.5%): birads 2. SFGD. Bx marker in L anterior breast.   5/12/25 Breast MRI: birads 4A.  - L upper slightly inner clumped NME (Rec MR guided bx). L slightly inner RA artifact at site of bx-proven IDP.  - R breast w/o suspicious enhancement.  - B/L areas of enhancement demonstrating 2-year stability corresponding w/ benign etiology.  - No axillary or IM LAD, bilaterally.   5/15/25 Left breast [upper slightly inner] MR guided bx: Invasive mammary carcinoma w/ lobular growth pattern (G2), ADH. [hourglass clip]. Concordant. (Rec surg onc). Pending receptors.  Addendum: Confirmed ductal differentiation in the tumor.   Yes

## 2025-07-09 NOTE — REVIEW OF SYSTEMS
[Patient Intake Form Reviewed] : Patient intake form was reviewed [SOB on Exertion] : shortness of breath during exertion [Urinary Frequency] : urinary frequency [Nocturia] : nocturia [Negative] : Heme/Lymph [Shortness Of Breath] : no shortness of breath [Cough] : no cough [Vaginal Discharge] : no vaginal discharge [Gait Disturbance] : no gait disturbance [FreeTextEntry6] : some PARK  [FreeTextEntry9] : chronic aches in her legs  [de-identified] : healed left breast/axilla surgical scars

## 2025-07-09 NOTE — PHYSICAL EXAM
[Sclera] : the sclera and conjunctiva were normal [Outer Ear] : the ears and nose were normal in appearance [] : no respiratory distress [No UE Edema] : there is no upper extremity edema [Normal] : oriented to person, place and time, the affect was normal, the mood was normal and not anxious [de-identified] : left periareolar scar healing well

## 2025-07-09 NOTE — HISTORY OF PRESENT ILLNESS
[FreeTextEntry1] : Ms. Yuen is a 69-year-old female who presents in consultation for consideration of radiation therapy. She is accompanied by her  for today's visit.   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 No lymphovascular invasion identified.  Margins negative for invasive carcinoma, DCIS focally less than 1 mm from the posterior margin.  ER+ (71-80%) ID+ (81-90%) HER2 Negative  Oncotype DX Recurrence Score: Pending  HPI:  History of LEFT Breast Biopsy in May 2023 - Intraductal Papilloma.   10/7/24 - Bilateral Mammogram: No mammographic evidence of malignancy. BIRADS 2   5/12/25 - Breast MRI: 1. Clumped non mass enhancement in the upper slightly inner left breast. MRI guided biopsy is recommended. 2. Areas of enhancement in the bilateral breasts demonstrating over two-year stability corresponding with benign etiology.  BIRADS 4A   5/15/25 - underwent LEFT Breast, upper slightly inner, Biopsy: Pathology - Invasive mammary carcinoma with lobular growth pattern, moderately differentiated, grade 2 (3+2+1). Invasive carcinoma measures at least 2 mm in this sample. Atypical ductal hyperplasia. ADDENDUM:  An E-cadherin immunostain shows membranous staining in invasive carcinoma cells, confirming ductal differentiation in the tumor (block 1A). ER+ (80%) ID+ (90%) HER2 Equivocal (2+) Negative by [facility]   5/22/25 - saw Dr. Reyes (surgeon) in follow up. Discussed imaging and biopsy results. Discussed surgical options as well as adjuvant treatments.  Patient opted for breast conservation. Plan to omit a sentinel lymph node biopsy as she meets criteria for the sound trial given her age, small size of invasive carcinoma and no lymph node involvement on breast imaging. Plan for a 2-site scot  localization to include known Left UIQ IDC and Left RA IDP.  5/22/25 - [employer]: Negative, VUS (BRIP1)  6/11/25 - underwent Two site LEFT Breast Lumpectomies with Dr. Reyes (surgeon): Pathology -  1.  Breast, LEFT, upper, Lumpectomy: Invasive Ductal carcinoma with lobular growth pattern (22 mm), moderately differentiated, Grade 2 (3+2+1). Lymphovascular invasion is not identified. Ductal carcinoma in situ (DCIS), solid and cribriform patterns with low to intermediate nuclear grade, DCIS is present in 5 of 16 examined slides. Margins: No involvement of the margins by invasive carcinoma. DCIS is focally less than 1 mm from the posterior margin and at least 2 mm from remaining margins.  Additional findings: Lobular carcinoma in situ (LCIS), classic type. Fibrocystic changes. Biopsy site changes. Calcifications identified in DCIS, LCIS, and benign epithelium.  Note: E-cadherin immunostains confirms ductal differentiation in the tumor and confirms the diagnosis of DCIS (blocks 1D, 1E, 1G, 1I).  ER+ (71-80%) ID+ (81-90%) HER2 Negative  2.  Breast, LEFT, upper Lumpectomy medial margin, excision: Benign breast tissue. Biopsy site changes.  3.  Breast, LEFT, upper Lumpectomy superior margin, excision: Benign breast tissue. Biopsy site changes.  4.  Breast, LEFT, retroareolar, Lumpectomy: Lobular carcinoma in situ (LCIS), classic type. Fibrocystic changes.  Biopsy site changes.   6/26/25 - saw Dr. Reyes (surgeon) in follow up. Discussed surgical pathology. Referrals to medical and radiation oncology made for adjuvant treatment recommendations. Oncotype DX requested. Next breast imaging, mammogram due 10/2025 and MRI 5/2026.   7/9/25 - presents in consultation for discussion of radiation therapy options. Ms. Yuen is feeling well today and looks forward to next steps in her treatment.  She is aware that the Oncotype Score is still pending.  She has healed well from her surgery and denies any pain. She is to see Dr. Benavides ([facility]) in consultation on 7/14/25.

## 2025-07-09 NOTE — VITALS
[Maximal Pain Intensity: 0/10] : 0/10 [Least Pain Intensity: 0/10] : 0/10 [NoTreatment Scheduled] : no treatment scheduled [90: Able to carry normal activity; minor signs or symptoms of disease.] : 90: Able to carry normal activity; minor signs or symptoms of disease.  [ECOG Performance Status: 0 - Fully active, able to carry on all pre-disease performance without restriction] : Performance Status: 0 - Fully active, able to carry on all pre-disease performance without restriction [Date: ____________] : Patient's last distress assessment performed on [unfilled]. [1 - Distress Level] : Distress Level: 1 [Patient given social work contact information and resource sheet] : Patient was given social work contact information and resource sheet

## 2025-07-09 NOTE — REVIEW OF SYSTEMS
[Patient Intake Form Reviewed] : Patient intake form was reviewed [SOB on Exertion] : shortness of breath during exertion [Urinary Frequency] : urinary frequency [Nocturia] : nocturia [Negative] : Heme/Lymph [Shortness Of Breath] : no shortness of breath [Cough] : no cough [Vaginal Discharge] : no vaginal discharge [Gait Disturbance] : no gait disturbance [FreeTextEntry6] : some PARK  [FreeTextEntry9] : chronic aches in her legs  [de-identified] : healed left breast/axilla surgical scars

## 2025-07-09 NOTE — PHYSICAL EXAM
[Sclera] : the sclera and conjunctiva were normal [Outer Ear] : the ears and nose were normal in appearance [] : no respiratory distress [No UE Edema] : there is no upper extremity edema [Normal] : oriented to person, place and time, the affect was normal, the mood was normal and not anxious [de-identified] : left periareolar scar healing well

## 2025-07-09 NOTE — HISTORY OF PRESENT ILLNESS
[FreeTextEntry1] : Ms. Yuen is a 69-year-old female who presents in consultation for consideration of radiation therapy. She is accompanied by her  for today's visit.   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 No lymphovascular invasion identified.  Margins negative for invasive carcinoma, DCIS focally less than 1 mm from the posterior margin.  ER+ (71-80%) MN+ (81-90%) HER2 Negative  Oncotype DX Recurrence Score: Pending  HPI:  History of LEFT Breast Biopsy in May 2023 - Intraductal Papilloma.   10/7/24 - Bilateral Mammogram: No mammographic evidence of malignancy. BIRADS 2   5/12/25 - Breast MRI: 1. Clumped non mass enhancement in the upper slightly inner left breast. MRI guided biopsy is recommended. 2. Areas of enhancement in the bilateral breasts demonstrating over two-year stability corresponding with benign etiology.  BIRADS 4A   5/15/25 - underwent LEFT Breast, upper slightly inner, Biopsy: Pathology - Invasive mammary carcinoma with lobular growth pattern, moderately differentiated, grade 2 (3+2+1). Invasive carcinoma measures at least 2 mm in this sample. Atypical ductal hyperplasia. ADDENDUM:  An E-cadherin immunostain shows membranous staining in invasive carcinoma cells, confirming ductal differentiation in the tumor (block 1A). ER+ (80%) MN+ (90%) HER2 Equivocal (2+) Negative by [facility]   5/22/25 - saw Dr. Reyes (surgeon) in follow up. Discussed imaging and biopsy results. Discussed surgical options as well as adjuvant treatments.  Patient opted for breast conservation. Plan to omit a sentinel lymph node biopsy as she meets criteria for the sound trial given her age, small size of invasive carcinoma and no lymph node involvement on breast imaging. Plan for a 2-site scot  localization to include known Left UIQ IDC and Left RA IDP.  5/22/25 - Search to Phone Genetics: Negative, VUS (BRIP1)  6/11/25 - underwent Two site LEFT Breast Lumpectomies with Dr. Reyes (surgeon): Pathology -  1.  Breast, LEFT, upper, Lumpectomy: Invasive Ductal carcinoma with lobular growth pattern (22 mm), moderately differentiated, Grade 2 (3+2+1). Lymphovascular invasion is not identified. Ductal carcinoma in situ (DCIS), solid and cribriform patterns with low to intermediate nuclear grade, DCIS is present in 5 of 16 examined slides. Margins: No involvement of the margins by invasive carcinoma. DCIS is focally less than 1 mm from the posterior margin and at least 2 mm from remaining margins.  Additional findings: Lobular carcinoma in situ (LCIS), classic type. Fibrocystic changes. Biopsy site changes. Calcifications identified in DCIS, LCIS, and benign epithelium.  Note: E-cadherin immunostains confirms ductal differentiation in the tumor and confirms the diagnosis of DCIS (blocks 1D, 1E, 1G, 1I).  ER+ (71-80%) MN+ (81-90%) HER2 Negative  2.  Breast, LEFT, upper Lumpectomy medial margin, excision: Benign breast tissue. Biopsy site changes.  3.  Breast, LEFT, upper Lumpectomy superior margin, excision: Benign breast tissue. Biopsy site changes.  4.  Breast, LEFT, retroareolar, Lumpectomy: Lobular carcinoma in situ (LCIS), classic type. Fibrocystic changes.  Biopsy site changes.   6/26/25 - saw Dr. Reyes (surgeon) in follow up. Discussed surgical pathology. Referrals to medical and radiation oncology made for adjuvant treatment recommendations. Oncotype DX requested. Next breast imaging, mammogram due 10/2025 and MRI 5/2026.   7/9/25 - presents in consultation for discussion of radiation therapy options. Ms. Yuen is feeling well today and looks forward to next steps in her treatment.  She is aware that the Oncotype Score is still pending.  She has healed well from her surgery and denies any pain. She is to see Dr. Benavides ([facility]) in consultation on 7/14/25.

## 2025-07-14 NOTE — PHYSICAL EXAM
[Fully active, able to carry on all pre-disease performance without restriction] : Status 0 - Fully active, able to carry on all pre-disease performance without restriction [Normal] : affect appropriate [de-identified] : left breast butch-areolar incision well healed; no palpable lesions, skin dimpling or nipple discharge from either breast

## 2025-07-14 NOTE — HISTORY OF PRESENT ILLNESS
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  Left breast U/S on 4/24/2023 showed no U/S correlate and an MRI 5/10/2023 with 7mm NME in the left retro areolar; MRI guided biopsy 5/12/2023 - invasive ducta papilloma with no atypia.  10/7/24 - Bilateral Mammogram: No mammographic evidence of malignancy. BIRADS 2  5/12/24 - MRI bilat breast  RIGHT BREAST: - There are scattered enhancing nonspecific foci.  There is no suspicious enhancement in the right breast. - 7 mm linear nonmass enhancement in the lower inner right breast (322 2-64), anterior depth, unchanged dating back to 3/28/2023. - 5 mm dominant focus of enhancement in the lower outer right breast (22940-79), anterior depth, unchanged dating back to 3/28/2023. LEFT BREAST: There is clumped nonmass enhancement in the upper slightly inner breast (3220 1-82). MRI guided biopsy recommended.  5/15/25 -LEFT Breast, upper slightly inner, Biopsy - Pathology - Invasive mammary carcinoma with lobular growth pattern, moderately differentiated, grade 2 (3+2+1). Invasive carcinoma measures at least 2 mm in this sample. Atypical ductal hyperplasia. ER+ 80%, HI+90%, HER2 Low (2+ on IHC, CISH non-amplified)  6/11/25 SURGERY: Left breast scot loc lumpectomy x2. L [upper] Invasive Ductal Carcinoma (G2) 2.2 cm. Negative margins for invasive. Associate DCIS (G1-2) focally <1 mm from the posterior margin. Additional classic LCIS. L [RA] classic LCIS Oncotype RS = 15 - correlates with 9 year distant recurrence risk of 4% with endocrine therapy as per TAILORx trial   B[facility] Density 10/22/2023 at [facility] Radiology: osteopenia   RISK ASSESSMENT: G(3)P(2), 29 first full term, Miscarriage at 3 months, Menarche (12) Menopause at (50)  Previous History - no HRT, OCP for 7 years, no fertility treatments Family History - Mother had stomach cancer, no family Hx of breast or ovarian cancer Germline Genetics - MyCabbage 5/22/25 Negative, VUS (BRIP1) [de-identified] : ER+PA+Her2 low (2+ IHC, CISH non-amplified)  ER+ (71-80%) PA+ (81-90%) HER2 Negative  ER+ (71-80%) PA+ (81-90%) HER2 Negative  ER+ (71-80%) PA+ (81-90%) HER2 Negative [de-identified] : [id number] No radiographically evident axillary LAD [de-identified] : 7/14/2025 Patient is here for an initial medical oncology consultation; to discuss adjuvant therapy  PCP - Dr. Willie Mejia Breast Surgeon - Dr. Sylvia Reyes RADONC - Dr. Елена Martins OBGYRANDY - Dr. Remington De La Torre [100: Normal, no complaints, no evidence of disease.] : 100: Normal, no complaints, no evidence of disease. [ECOG Performance Status: 0 - Fully active, able to carry on all pre-disease performance without restriction] : Performance Status: 0 - Fully active, able to carry on all pre-disease performance without restriction

## 2025-07-14 NOTE — ASSESSMENT
[FreeTextEntry1] : Patient Kori Yuen is a 69y female patient seeking an initial oncological medical consultation for LEFT Breast, Invasive moderately differentiated Ductal Carcinoma with Lobular growth pattern (22 mm) focally with LCIS  - with low Oncotype score of 15, no role for adjuvant chemotherapy - planned for adjuvant radiation therapy; has met with Dr. Martins and had radiation simulation with plan to initiate adjuvant RT for 16 session when she returns from [place] in about 2 weeks - after completion of radiation would wait about 2-4 weeks and thereafter start adjuvant endocrine therpy - would recommend to start with anastrozole 1 mg daily with plan for about 7 years of therapy based on results of the [facility] Breast and Colorectal Cancer Study Group; ABCSG-16/SALSA published in N Engl J Med 2021;385:395-405. The study showed that extending endocrine therapy for a total of 10 provided no benefit over a total of 7 years, but 10 years of therapy was associated with greater risk of bone fractures. - script sent to pharmacy and patient knows to  but not start until after completing RT - reviewed labs BMD done in 10/2023 at [facility]; T score -1.5;  will obtain copy from them; plan to have repeat done in 10/2025 at [facility]; script in chart  - Discussed with patient that exercise and lifestyle modifications can result in improvement of fatigue, anxiety, sleep disturbance, depression and ultimately overall survival. Based on the current literature, an effective exercise prescription that most consistently addresses health-related outcomes experienced due to cancer diagnosis and cancer treatment includes moderate intensity aerobic training at least 3 times per week, for at least 30 minutes for at least 8-12 weeks. The addition of resistance training to aerobic training, at least 2 times per week, using at least 2 sets of 8-15 repetitions at least 60% of one repetition maximum, appears to result in similar benefits (Fredrick et all 2019, American College of Sports Medicine).  - Patient had the opportunity to have all their questions answered to their satisfaction - follow up in 10/2025; thereafter at 6 month intervals

## 2025-07-14 NOTE — HISTORY OF PRESENT ILLNESS
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  Left breast U/S on 4/24/2023 showed no U/S correlate and an MRI 5/10/2023 with 7mm NME in the left retro areolar; MRI guided biopsy 5/12/2023 - invasive ducta papilloma with no atypia.  10/7/24 - Bilateral Mammogram: No mammographic evidence of malignancy. BIRADS 2  5/12/24 - MRI bilat breast  RIGHT BREAST: - There are scattered enhancing nonspecific foci.  There is no suspicious enhancement in the right breast. - 7 mm linear nonmass enhancement in the lower inner right breast (322 2-64), anterior depth, unchanged dating back to 3/28/2023. - 5 mm dominant focus of enhancement in the lower outer right breast (62705-26), anterior depth, unchanged dating back to 3/28/2023. LEFT BREAST: There is clumped nonmass enhancement in the upper slightly inner breast (3220 1-82). MRI guided biopsy recommended.  5/15/25 -LEFT Breast, upper slightly inner, Biopsy - Pathology - Invasive mammary carcinoma with lobular growth pattern, moderately differentiated, grade 2 (3+2+1). Invasive carcinoma measures at least 2 mm in this sample. Atypical ductal hyperplasia. ER+ 80%, SD+90%, HER2 Low (2+ on IHC, CISH non-amplified)  6/11/25 SURGERY: Left breast scot loc lumpectomy x2. L [upper] Invasive Ductal Carcinoma (G2) 2.2 cm. Negative margins for invasive. Associate DCIS (G1-2) focally <1 mm from the posterior margin. Additional classic LCIS. L [RA] classic LCIS Oncotype RS = 15 - correlates with 9 year distant recurrence risk of 4% with endocrine therapy as per TAILORx trial   Bone Density 10/22/2023 at [facility] Radiology: osteopenia   RISK ASSESSMENT: G(3)P(2), 29 first full term, Miscarriage at 3 months, Menarche (12) Menopause at (50)  Previous History - no HRT, OCP for 7 years, no fertility treatments Family History - Mother had stomach cancer, no family Hx of breast or ovarian cancer Germline Genetics - Hemosphere 5/22/25 Negative, VUS (BRIP1) [de-identified] : ER+SC+Her2 low (2+ IHC, CISH non-amplified)  ER+ (71-80%) SC+ (81-90%) HER2 Negative  ER+ (71-80%) SC+ (81-90%) HER2 Negative  ER+ (71-80%) SC+ (81-90%) HER2 Negative [de-identified] : [id number] No radiographically evident axillary LAD [de-identified] : 7/14/2025 Patient is here for an initial medical oncology consultation; to discuss adjuvant therapy  PCP - Dr. Willie Mejia Breast Surgeon - Dr. Sylvia Reyes RADONC - Dr. Елена Martins OBGYRANDY - Dr. Remington De La Torre [100: Normal, no complaints, no evidence of disease.] : 100: Normal, no complaints, no evidence of disease. [ECOG Performance Status: 0 - Fully active, able to carry on all pre-disease performance without restriction] : Performance Status: 0 - Fully active, able to carry on all pre-disease performance without restriction

## 2025-07-14 NOTE — CONSULT LETTER
[Dear  ___] : Dear  [unfilled], [Consult Letter:] : I had the pleasure of evaluating your patient, [unfilled]. [Please see my note below.] : Please see my note below. [Consult Closing:] : Thank you very much for allowing me to participate in the care of this patient.  If you have any questions, please do not hesitate to contact me. [Sincerely,] : Sincerely, [DrNel  ___] : Dr. AGEE [DrNel ___] : Dr. AGEE [FreeTextEntry3] : Paulina Benavides MD   Division of Hematology/[place] [address]  Tel: [phone]  Fax: [phone]  Email: [email or web address]

## 2025-07-14 NOTE — ASSESSMENT
[FreeTextEntry1] : Patient Kori Yuen is a 69y female patient seeking an initial oncological medical consultation for LEFT Breast, Invasive moderately differentiated Ductal Carcinoma with Lobular growth pattern (22 mm) focally with LCIS  - with low Oncotype score of 15, no role for adjuvant chemotherapy - planned for adjuvant radiation therapy; has met with Dr. Martins and had radiation simulation with plan to initiate adjuvant RT for 16 session when she returns from [place] in about 2 weeks - after completion of radiation would wait about 2-4 weeks and thereafter start adjuvant endocrine therpy - would recommend to start with anastrozole 1 mg daily with plan for about 7 years of therapy based on results of the [facility] Breast and Colorectal Cancer Study Group; ABCSG-16/SALSA published in N Engl J Med 2021;385:395-405. The study showed that extending endocrine therapy for a total of 10 provided no benefit over a total of 7 years, but 10 years of therapy was associated with greater risk of bone fractures. - script sent to pharmacy and patient knows to  but not start until after completing RT - reviewed labs BMD done in 10/2023 at [facility]; T score -1.5;  will obtain copy from them; plan to have repeat done in 10/2025 at [facility]; script in chart  - Discussed with patient that exercise and lifestyle modifications can result in improvement of fatigue, anxiety, sleep disturbance, depression and ultimately overall survival. Based on the current literature, an effective exercise prescription that most consistently addresses health-related outcomes experienced due to cancer diagnosis and cancer treatment includes moderate intensity aerobic training at least 3 times per week, for at least 30 minutes for at least 8-12 weeks. The addition of resistance training to aerobic training, at least 2 times per week, using at least 2 sets of 8-15 repetitions at least 60% of one repetition maximum, appears to result in similar benefits (Fredrick et all 2019, American College of Sports Medicine).  - Patient had the opportunity to have all their questions answered to their satisfaction - follow up in 10/2025; thereafter at 6 month intervals

## 2025-07-14 NOTE — CONSULT LETTER
[Dear  ___] : Dear  [unfilled], [Consult Letter:] : I had the pleasure of evaluating your patient, [unfilled]. [Please see my note below.] : Please see my note below. [Consult Closing:] : Thank you very much for allowing me to participate in the care of this patient.  If you have any questions, please do not hesitate to contact me. [Sincerely,] : Sincerely, [DrNel  ___] : Dr. AGEE [DrNel ___] : Dr. AGEE [FreeTextEntry3] : Paulina Benavides MD   Division of Hematology/[place] [address]  Tel: [phone]  Fax: [phone]  Email: [email or web address]

## 2025-07-14 NOTE — CONSULT LETTER
[Dear  ___] : Dear  [unfilled], [Consult Letter:] : I had the pleasure of evaluating your patient, [unfilled]. [Please see my note below.] : Please see my note below. [Consult Closing:] : Thank you very much for allowing me to participate in the care of this patient.  If you have any questions, please do not hesitate to contact me. [Sincerely,] : Sincerely, [DrNel  ___] : Dr. AGEE [DrNel ___] : Dr. AGEE [FreeTextEntry3] : Paulina Benavides MD   Division of Hematology/[place] [address]  Tel: [phone]  Fax: [phone]  Email: [email or web address]

## 2025-07-14 NOTE — ASSESSMENT
[FreeTextEntry1] : Patient Kori Yuen is a 69y female patient seeking an initial oncological medical consultation for LEFT Breast, Invasive moderately differentiated Ductal Carcinoma with Lobular growth pattern (22 mm) focally with LCIS  - with low Oncotype score of 15, no role for adjuvant chemotherapy - planned for adjuvant radiation therapy; has met with Dr. Martins and had radiation simulation with plan to initiate adjuvant RT for 16 session when she returns from [place] in about 2 weeks - after completion of radiation would wait about 2-4 weeks and thereafter start adjuvant endocrine therpy - would recommend to start with anastrozole 1 mg daily with plan for about 7 years of therapy based on results of the [facility] Breast and Colorectal Cancer Study Group; ABCSG-16/SALSA published in N Engl J Med 2021;385:395-405. The study showed that extending endocrine therapy for a total of 10 provided no benefit over a total of 7 years, but 10 years of therapy was associated with greater risk of bone fractures. - script sent to pharmacy and patient knows to  but not start until after completing RT - reviewed labs BMD done in 10/2023 at [facility]; T score -1.5;  will obtain copy from them; plan to have repeat done in 10/2025 at [facility]; script in chart  - Discussed with patient that exercise and lifestyle modifications can result in improvement of fatigue, anxiety, sleep disturbance, depression and ultimately overall survival. Based on the current literature, an effective exercise prescription that most consistently addresses health-related outcomes experienced due to cancer diagnosis and cancer treatment includes moderate intensity aerobic training at least 3 times per week, for at least 30 minutes for at least 8-12 weeks. The addition of resistance training to aerobic training, at least 2 times per week, using at least 2 sets of 8-15 repetitions at least 60% of one repetition maximum, appears to result in similar benefits (Fredrick et all 2019, American College of Sports Medicine).  - Patient had the opportunity to have all their questions answered to their satisfaction - follow up in 10/2025; thereafter at 6 month intervals

## 2025-07-14 NOTE — PHYSICAL EXAM
[Fully active, able to carry on all pre-disease performance without restriction] : Status 0 - Fully active, able to carry on all pre-disease performance without restriction [Normal] : affect appropriate [de-identified] : left breast butch-areolar incision well healed; no palpable lesions, skin dimpling or nipple discharge from either breast

## 2025-07-14 NOTE — ADDENDUM
[FreeTextEntry1] :  I, Manuel Torres, affirm that I have recorded for Dr. Benavides on 07/14/2025 to the best of my ability. All medical record entries scribed were at my, Dr. Benavides's, direction on 07/14/2025 . I have reviewed the chart and agree that the record accurately reflects my personal performance of the history, physical exam, assessment and plan. I have also personally directed, reviewed, and agree with the discharge instructions.

## 2025-07-14 NOTE — HISTORY OF PRESENT ILLNESS
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  Left breast U/S on 4/24/2023 showed no U/S correlate and an MRI 5/10/2023 with 7mm NME in the left retro areolar; MRI guided biopsy 5/12/2023 - invasive ducta papilloma with no atypia.  10/7/24 - Bilateral Mammogram: No mammographic evidence of malignancy. BIRADS 2  5/12/24 - MRI bilat breast  RIGHT BREAST: - There are scattered enhancing nonspecific foci.  There is no suspicious enhancement in the right breast. - 7 mm linear nonmass enhancement in the lower inner right breast (322 2-64), anterior depth, unchanged dating back to 3/28/2023. - 5 mm dominant focus of enhancement in the lower outer right breast (11278-68), anterior depth, unchanged dating back to 3/28/2023. LEFT BREAST: There is clumped nonmass enhancement in the upper slightly inner breast (3220 1-82). MRI guided biopsy recommended.  5/15/25 -LEFT Breast, upper slightly inner, Biopsy - Pathology - Invasive mammary carcinoma with lobular growth pattern, moderately differentiated, grade 2 (3+2+1). Invasive carcinoma measures at least 2 mm in this sample. Atypical ductal hyperplasia. ER+ 80%, CT+90%, HER2 Low (2+ on IHC, CISH non-amplified)  6/11/25 SURGERY: Left breast scot loc lumpectomy x2. L [upper] Invasive Ductal Carcinoma (G2) 2.2 cm. Negative margins for invasive. Associate DCIS (G1-2) focally <1 mm from the posterior margin. Additional classic LCIS. L [RA] classic LCIS Oncotype RS = 15 - correlates with 9 year distant recurrence risk of 4% with endocrine therapy as per TAILORx trial   Bone Density 10/22/2023 at [facility] Radiology: osteopenia   RISK ASSESSMENT: G(3)P(2), 29 first full term, Miscarriage at 3 months, Menarche (12) Menopause at (50)  Previous History - no HRT, OCP for 7 years, no fertility treatments Family History - Mother had stomach cancer, no family Hx of breast or ovarian cancer Germline Genetics - 3scale 5/22/25 Negative, VUS (BRIP1) [de-identified] : ER+AL+Her2 low (2+ IHC, CISH non-amplified)  ER+ (71-80%) AL+ (81-90%) HER2 Negative  ER+ (71-80%) AL+ (81-90%) HER2 Negative  ER+ (71-80%) AL+ (81-90%) HER2 Negative [de-identified] : [id number] No radiographically evident axillary LAD [de-identified] : 7/14/2025 Patient is here for an initial medical oncology consultation; to discuss adjuvant therapy  PCP - Dr. Willie Mejia Breast Surgeon - Dr. Sylvia Reyes RADONC - Dr. Елена Martins OBGYRANDY - Dr. Remington De La Torre [100: Normal, no complaints, no evidence of disease.] : 100: Normal, no complaints, no evidence of disease. [ECOG Performance Status: 0 - Fully active, able to carry on all pre-disease performance without restriction] : Performance Status: 0 - Fully active, able to carry on all pre-disease performance without restriction

## 2025-07-14 NOTE — PHYSICAL EXAM
[Fully active, able to carry on all pre-disease performance without restriction] : Status 0 - Fully active, able to carry on all pre-disease performance without restriction [Normal] : affect appropriate [de-identified] : left breast butch-areolar incision well healed; no palpable lesions, skin dimpling or nipple discharge from either breast